# Patient Record
Sex: FEMALE | Race: BLACK OR AFRICAN AMERICAN
[De-identification: names, ages, dates, MRNs, and addresses within clinical notes are randomized per-mention and may not be internally consistent; named-entity substitution may affect disease eponyms.]

---

## 2019-12-30 ENCOUNTER — HOSPITAL ENCOUNTER (OUTPATIENT)
Dept: HOSPITAL 53 - M RAD | Age: 24
End: 2019-12-30
Attending: STUDENT IN AN ORGANIZED HEALTH CARE EDUCATION/TRAINING PROGRAM
Payer: COMMERCIAL

## 2019-12-30 DIAGNOSIS — N64.52: Primary | ICD-10-CM

## 2020-01-01 NOTE — REP
FOCUSED RIGHT SUBAREOLAR, RIGHT AXILLARY, AND LEFT AXILLARY BREAST SONOGRAPHY:

 

History:  Right axillary "fullness" and one incident of clear right nipple

discharge.

 

Sonographic findings:  The left axilla is scanned for comparison purposes and is

unremarkable.  Scanning in the right axillary soft tissues shows no evidence of

adenopathy, mass, or cyst.  Subareolar scanning of the right demonstrates

heterogeneous fibroglandular background echotexture.  No cyst, mass, or acoustic

shadowing is appreciated.  No suspicious sonographic finding.

 

Impression:

 

BIRADS category 1 negative findings.  Clinical followup is advised.

 

 

Electronically Signed by

Chevy Banda MD 01/01/2020 06:24 P